# Patient Record
Sex: FEMALE | Race: WHITE | Employment: OTHER
[De-identification: names, ages, dates, MRNs, and addresses within clinical notes are randomized per-mention and may not be internally consistent; named-entity substitution may affect disease eponyms.]

---

## 2020-12-08 ENCOUNTER — TRANSCRIBE ORDERS (OUTPATIENT)
Dept: OTHER | Age: 60
End: 2020-12-08

## 2020-12-08 DIAGNOSIS — H50.9 STRABISMUS: Primary | ICD-10-CM

## 2021-01-09 ENCOUNTER — HEALTH MAINTENANCE LETTER (OUTPATIENT)
Age: 61
End: 2021-01-09

## 2021-01-15 ENCOUNTER — OFFICE VISIT (OUTPATIENT)
Dept: OPHTHALMOLOGY | Facility: CLINIC | Age: 61
End: 2021-01-15
Attending: OPTOMETRIST
Payer: COMMERCIAL

## 2021-01-15 DIAGNOSIS — H50.9 STRABISMUS: ICD-10-CM

## 2021-01-15 DIAGNOSIS — H53.10 SUBJECTIVE VISUAL DISTURBANCE: ICD-10-CM

## 2021-01-15 DIAGNOSIS — H53.2 DOUBLE VISION: ICD-10-CM

## 2021-01-15 PROCEDURE — G0463 HOSPITAL OUTPT CLINIC VISIT: HCPCS | Performed by: TECHNICIAN/TECHNOLOGIST

## 2021-01-15 PROCEDURE — 99203 OFFICE O/P NEW LOW 30 MIN: CPT | Performed by: OPHTHALMOLOGY

## 2021-01-15 RX ORDER — AMLODIPINE BESYLATE 5 MG/1
TABLET ORAL
COMMUNITY
Start: 2020-11-06

## 2021-01-15 RX ORDER — FLUTICASONE PROPIONATE 50 MCG
1 SPRAY, SUSPENSION (ML) NASAL
COMMUNITY

## 2021-01-15 RX ORDER — GABAPENTIN 300 MG/1
CAPSULE ORAL
COMMUNITY
Start: 2020-12-02

## 2021-01-15 RX ORDER — ACETAMINOPHEN 325 MG/1
1-2 TABLET ORAL
COMMUNITY

## 2021-01-15 RX ORDER — CYCLOBENZAPRINE HCL 10 MG
TABLET ORAL
COMMUNITY
Start: 2020-09-14

## 2021-01-15 RX ORDER — TOPIRAMATE 100 MG/1
TABLET, FILM COATED ORAL
COMMUNITY
Start: 2020-11-30

## 2021-01-15 RX ORDER — DEXAMETHASONE SODIUM PHOSPHATE 1 MG/ML
SOLUTION/ DROPS OPHTHALMIC
COMMUNITY
Start: 2020-12-21

## 2021-01-15 RX ORDER — DIAZEPAM 5 MG
TABLET ORAL
COMMUNITY
Start: 2020-11-24

## 2021-01-15 RX ORDER — IBUPROFEN 200 MG
200-600 TABLET ORAL
COMMUNITY
Start: 2020-12-21

## 2021-01-15 RX ORDER — DOXEPIN HYDROCHLORIDE 50 MG/1
CAPSULE ORAL
COMMUNITY
Start: 2020-09-14

## 2021-01-15 RX ORDER — CIPROFLOXACIN HYDROCHLORIDE 3.5 MG/ML
SOLUTION/ DROPS TOPICAL
COMMUNITY
Start: 2020-12-21

## 2021-01-15 ASSESSMENT — CONF VISUAL FIELD
OD_NORMAL: 1
METHOD: COUNTING FINGERS
OS_NORMAL: 1

## 2021-01-15 ASSESSMENT — VISUAL ACUITY
OD_CC+: -2
METHOD: SNELLEN - LINEAR
CORRECTION_TYPE: GLASSES
OD_CC: 20/30
OS_CC: 20/30
OS_CC+: -2

## 2021-01-15 ASSESSMENT — EXTERNAL EXAM - RIGHT EYE: OD_EXAM: NORMAL

## 2021-01-15 ASSESSMENT — TONOMETRY
OS_IOP_MMHG: 10
IOP_METHOD: ICARE
OD_IOP_MMHG: 10

## 2021-01-15 ASSESSMENT — SLIT LAMP EXAM - LIDS
COMMENTS: NORMAL
COMMENTS: NORMAL

## 2021-01-15 ASSESSMENT — REFRACTION_WEARINGRX
OD_HPRISM: 10 BO
OS_CYLINDER: +0.50
OD_CYLINDER: SPHERE
OD_SPHERE: -1.75
OD_ADD: +3.00
OS_HPRISM: 10 BO
OS_SPHERE: -1.00
OS_AXIS: 054
OS_ADD: +3.00
SPECS_TYPE: PAL

## 2021-01-15 ASSESSMENT — MARGIN REFLEX DISTANCE
OS_MRD1: 1
OD_MRD1: 2

## 2021-01-15 ASSESSMENT — EXTERNAL EXAM - LEFT EYE: OS_EXAM: PTOSIS

## 2021-01-15 NOTE — PROGRESS NOTES
1.  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  Subjectively the patient's diplopia has been stable for 4 years which is also objectively corroborated by lack of change in her prism requirements in that timeframe.  Today the patient is highly motivated for strabismus surgery.  We discussed the risk benefits and alternatives of strabismus surgery surgery and she wishes to proceed although she does plan to first find out whether additional surgery will be indicated for middle ear involvement by her meningioma.  Patient will call us back when she decides that she would like to proceed with strabismus surgery.    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 She reports that she started having horizontal binocular diplopia 4 year ago that has been stable since.  No change in prism strength in that time frame    She has been wearing prism glasses with the same prism prescription for 4 years. She started having right ear pain for 2 years that did not resistant to oral antibiotics. Then they saw Dr Rocha - ENT- who did a biopsy if the right middle ear that revealed a meningioma. He thinks that it grew fron CP angle in to her ear. Her last visit with neurosurgeon Dr De La Cruz was in 9/21/2020, back then he thought she was stable and he want to see her in 1 year with repeat MRI brain. Dr Rocha thought that the thinner slices did show that the meningiomas were indeed connected. She has chronic migraines that have been stable,they get worse with change in weather    Patient may have additional inner ear surgery. No plans for radiation therapy.      Independent  historians:   present during the meeting and providing additional information.    Review of outside testing:    MRI internal auditory canal with and without contrast 12/3/2020  IMPRESSION:  1. Both CT and MRI demonstrated the large recurrent extra-axial mass with osseous involvement in the region of the right cerebellopontine angle, consistent with recurrent meningioma.  2. Mass involves the clivus, right occipital condyle/occipital bone, right temporal bone petrous apex extending to the mastoid segment.  3. The mass extends into the posterior aspect of the right sphenoid sinus, posterior right petrous carotid canal, right inferior cavernous sinus.  4. The mass nearly completely opacifies the right IAC, encasing the associated nerves.  5. Mass encircles approximately 180 degrees of the intradural right vertebral artery.  6. The mass extends through an approximately 2 cm inferior to the right jugular foramen and abuts the extracranial portion of the cervical right ICA.  7. Dural thickening extends into the anterior aspect of the left IAC; this is suspected to represent venous enhancement/congestion; however, extension of mass into the left IAC cannot be entirely excluded on imaging.  8. Prior right retrosigmoid craniotomy.    MRI brain with and without contrast 9/21/2020  IMPRESSION:  1. Right ventral prepontine cistern meningioma appears slightly more full going back over several examinations, however no significant change when compared to the most recent prior. No new subjacent brain stem edema. There is stable T2 hyperintensity within the right inferior mark.  2. Stable invasion of the right aspect of the clivus and the jugular tubercle through the right jugular fossa. High right carotid space component of the lesion is increased in size measuring 11 x 17 mm, previously 8 x 17 mm.    We do not have access to the MRIs.  We are requesting they be pushed to us.    Sensorimotor examination today notable for a  mild abduction deficit in the right eye.  There was a 20-25 prism diopter esotropia in primary gaze that increased in right gaze and decreased in left gaze.    We discussed in detail the risks, benefits, and alternatives of eye muscle correction surgery including the very rare risk of death or serious morbidity from a general anesthesia complication and the rare risk of severe vision loss in the operative eye(s) secondary to retinal detachment or endophthalmitis.  We discussed more likely sub-optimal outcomes including the unanticipated need for additional strabismus surgery.  Finally the patient was aware that prisms glasses may be required to optimize single vision following surgery.    After a thorough discussion of these risks, the patient decided to consider strabismus surgery.  The surgical plan would be as follows:     1. Right lateral rectus resection    2. Left medial rectus recession     Both on fixed suture considering the resection of paretic muscle    Follow-up 1 week after strabismus surgery.    [place] [facility] [place]   Prism free glasses for adjustment: Non-adjustable. Patient feels she has prism free glasses at home (will check on this)         35 minutes were spent on the date of the encounter by me doing chart review, history and exam, documentation, and further activities as noted above    Complete documentation of historical and exam elements from today's encounter can be found in the full encounter summary report (not reduplicated in this progress note).  I personally obtained the chief complaint(s) and history of present illness.  I confirmed and edited as necessary the review of systems, past medical/surgical history, family history, social history, and examination findings as documented by others; and I examined the patient myself.  I personally reviewed the relevant tests, images, and reports as documented above.  I formulated and edited as necessary the assessment and plan and discussed  the findings and management plan with the patient and family     Joel Mesa MD

## 2021-01-20 ENCOUNTER — MYC MEDICAL ADVICE (OUTPATIENT)
Dept: OPHTHALMOLOGY | Facility: CLINIC | Age: 61
End: 2021-01-20

## 2021-01-20 DIAGNOSIS — H53.2 DOUBLE VISION: Primary | ICD-10-CM

## 2021-01-21 ENCOUNTER — TELEPHONE (OUTPATIENT)
Dept: OPHTHALMOLOGY | Facility: CLINIC | Age: 61
End: 2021-01-21

## 2021-01-21 ENCOUNTER — PREP FOR PROCEDURE (OUTPATIENT)
Dept: OPHTHALMOLOGY | Facility: CLINIC | Age: 61
End: 2021-01-21

## 2021-01-21 DIAGNOSIS — H53.2 DOUBLE VISION: Primary | ICD-10-CM

## 2021-01-21 NOTE — TELEPHONE ENCOUNTER
1/21/2021 1:45PM Offered 2/1 surgery date. Nadia requested additional dates (gave her 2/8 & 2/15). She will call back to schedule.

## 2021-01-30 DIAGNOSIS — Z11.59 ENCOUNTER FOR SCREENING FOR OTHER VIRAL DISEASES: ICD-10-CM

## 2021-02-01 ENCOUNTER — TELEPHONE (OUTPATIENT)
Dept: OPHTHALMOLOGY | Facility: CLINIC | Age: 61
End: 2021-02-01

## 2021-02-01 NOTE — TELEPHONE ENCOUNTER
2/1/2021 2:26PM Relayed to Nadia that Dr. Mesa recommends she not schedule an appointment with the oculoplastics doctor to coordinate with her 2/26 post-op appointment. Instead, he will have her come back to see him in 3-4 months after the 2/26 appointment to allow her eye(s) to heal. She may schedule an appointment with the oculoplastics doctor at that time.    2/1/2021 11:22AM Received a call from Jesus and Nadia Corbett. Nadia had her H&P on 1/29/2021 and they wanted to confirm that it was pushed through to us; confirmed H&P was available in our system.    They have COVID-19 testing scheduled in [place] on 2/11 & wanted to confirm that date was acceptable for surgery on 2/15; confirmed. They also requested the scheduling number for testing at [facility] to schedule testing on Friday, 2/12, in case there is a 'snafu' with testing in [place] on the 11th; number provided.    They also wish to schedule an appointment with the oculoplastics provider on 2/26/2021 when they have their followup with Dr. Mesa. Advised we do not have an oculoplastics provider available on 2/25 or 2/26. Offered 3/1 appointment with Dr. Apodaca, but will confirm with Dr. Mesa that consult with oculoplastics provider on 3/1 is appropriate prior to scheduling.     Also asked about prior authorization status for their insurance, [employer]. Advised that I contacted [employer] [facility] on 1/21/2021 and determined that prior authorization is not needed for Nadia's 2/15/2021 surgery.

## 2021-02-12 ENCOUNTER — ANESTHESIA EVENT (OUTPATIENT)
Dept: SURGERY | Facility: AMBULATORY SURGERY CENTER | Age: 61
End: 2021-02-12

## 2021-02-15 ENCOUNTER — ANESTHESIA (OUTPATIENT)
Dept: SURGERY | Facility: AMBULATORY SURGERY CENTER | Age: 61
End: 2021-02-15

## 2021-02-15 ENCOUNTER — HOSPITAL ENCOUNTER (OUTPATIENT)
Facility: AMBULATORY SURGERY CENTER | Age: 61
Discharge: HOME | End: 2021-02-15
Attending: OPHTHALMOLOGY | Admitting: OPHTHALMOLOGY
Payer: COMMERCIAL

## 2021-02-15 VITALS
WEIGHT: 172 LBS | TEMPERATURE: 97 F | BODY MASS INDEX: 27 KG/M2 | HEIGHT: 67 IN | HEART RATE: 86 BPM | SYSTOLIC BLOOD PRESSURE: 137 MMHG | OXYGEN SATURATION: 96 % | RESPIRATION RATE: 14 BRPM | DIASTOLIC BLOOD PRESSURE: 79 MMHG

## 2021-02-15 DIAGNOSIS — Z98.890 POSTOPERATIVE EYE STATE: Primary | ICD-10-CM

## 2021-02-15 DIAGNOSIS — H53.2 DOUBLE VISION: ICD-10-CM

## 2021-02-15 PROCEDURE — 67311 REVISE EYE MUSCLE: CPT | Mod: RT

## 2021-02-15 RX ORDER — SODIUM CHLORIDE, SODIUM LACTATE, POTASSIUM CHLORIDE, CALCIUM CHLORIDE 600; 310; 30; 20 MG/100ML; MG/100ML; MG/100ML; MG/100ML
INJECTION, SOLUTION INTRAVENOUS CONTINUOUS
Status: DISCONTINUED | OUTPATIENT
Start: 2021-02-15 | End: 2021-02-16 | Stop reason: HOSPADM

## 2021-02-15 RX ORDER — NALOXONE HYDROCHLORIDE 0.4 MG/ML
0.4 INJECTION, SOLUTION INTRAMUSCULAR; INTRAVENOUS; SUBCUTANEOUS
Status: DISCONTINUED | OUTPATIENT
Start: 2021-02-15 | End: 2021-02-16 | Stop reason: HOSPADM

## 2021-02-15 RX ORDER — ALBUTEROL SULFATE 0.83 MG/ML
2.5 SOLUTION RESPIRATORY (INHALATION) EVERY 4 HOURS PRN
Status: DISCONTINUED | OUTPATIENT
Start: 2021-02-15 | End: 2021-02-15 | Stop reason: HOSPADM

## 2021-02-15 RX ORDER — BALANCED SALT SOLUTION 6.4; .75; .48; .3; 3.9; 1.7 MG/ML; MG/ML; MG/ML; MG/ML; MG/ML; MG/ML
SOLUTION OPHTHALMIC PRN
Status: DISCONTINUED | OUTPATIENT
Start: 2021-02-15 | End: 2021-02-15 | Stop reason: HOSPADM

## 2021-02-15 RX ORDER — ACETAMINOPHEN 325 MG/1
975 TABLET ORAL ONCE
Status: COMPLETED | OUTPATIENT
Start: 2021-02-15 | End: 2021-02-15

## 2021-02-15 RX ORDER — OXYMETAZOLINE HYDROCHLORIDE 0.05 G/100ML
SPRAY NASAL PRN
Status: DISCONTINUED | OUTPATIENT
Start: 2021-02-15 | End: 2021-02-15 | Stop reason: HOSPADM

## 2021-02-15 RX ORDER — DIMENHYDRINATE 50 MG/ML
25 INJECTION, SOLUTION INTRAMUSCULAR; INTRAVENOUS
Status: DISCONTINUED | OUTPATIENT
Start: 2021-02-15 | End: 2021-02-16 | Stop reason: HOSPADM

## 2021-02-15 RX ORDER — SODIUM CHLORIDE, SODIUM LACTATE, POTASSIUM CHLORIDE, CALCIUM CHLORIDE 600; 310; 30; 20 MG/100ML; MG/100ML; MG/100ML; MG/100ML
INJECTION, SOLUTION INTRAVENOUS CONTINUOUS PRN
Status: DISCONTINUED | OUTPATIENT
Start: 2021-02-15 | End: 2021-02-15

## 2021-02-15 RX ORDER — LIDOCAINE HYDROCHLORIDE 20 MG/ML
INJECTION, SOLUTION INFILTRATION; PERINEURAL PRN
Status: DISCONTINUED | OUTPATIENT
Start: 2021-02-15 | End: 2021-02-15

## 2021-02-15 RX ORDER — GABAPENTIN 300 MG/1
300 CAPSULE ORAL ONCE
Status: COMPLETED | OUTPATIENT
Start: 2021-02-15 | End: 2021-02-15

## 2021-02-15 RX ORDER — LORAZEPAM 2 MG/ML
.5-1 INJECTION INTRAMUSCULAR
Status: DISCONTINUED | OUTPATIENT
Start: 2021-02-15 | End: 2021-02-15 | Stop reason: HOSPADM

## 2021-02-15 RX ORDER — MEPERIDINE HYDROCHLORIDE 25 MG/ML
12.5 INJECTION INTRAMUSCULAR; INTRAVENOUS; SUBCUTANEOUS
Status: DISCONTINUED | OUTPATIENT
Start: 2021-02-15 | End: 2021-02-16 | Stop reason: HOSPADM

## 2021-02-15 RX ORDER — PREDNISOLONE ACETATE 10 MG/ML
SUSPENSION/ DROPS OPHTHALMIC
Qty: 10 ML | Refills: 0 | Status: SHIPPED | OUTPATIENT
Start: 2021-02-15

## 2021-02-15 RX ORDER — ONDANSETRON 4 MG/1
4 TABLET, ORALLY DISINTEGRATING ORAL EVERY 30 MIN PRN
Status: DISCONTINUED | OUTPATIENT
Start: 2021-02-15 | End: 2021-02-16 | Stop reason: HOSPADM

## 2021-02-15 RX ORDER — KETOROLAC TROMETHAMINE 30 MG/ML
30 INJECTION, SOLUTION INTRAMUSCULAR; INTRAVENOUS EVERY 6 HOURS PRN
Status: DISCONTINUED | OUTPATIENT
Start: 2021-02-15 | End: 2021-02-16 | Stop reason: HOSPADM

## 2021-02-15 RX ORDER — PROPOFOL 10 MG/ML
INJECTION, EMULSION INTRAVENOUS PRN
Status: DISCONTINUED | OUTPATIENT
Start: 2021-02-15 | End: 2021-02-15

## 2021-02-15 RX ORDER — FENTANYL CITRATE 50 UG/ML
25-50 INJECTION, SOLUTION INTRAMUSCULAR; INTRAVENOUS
Status: DISCONTINUED | OUTPATIENT
Start: 2021-02-15 | End: 2021-02-16 | Stop reason: HOSPADM

## 2021-02-15 RX ORDER — FENTANYL CITRATE 50 UG/ML
25-50 INJECTION, SOLUTION INTRAMUSCULAR; INTRAVENOUS
Status: DISCONTINUED | OUTPATIENT
Start: 2021-02-15 | End: 2021-02-15 | Stop reason: HOSPADM

## 2021-02-15 RX ORDER — PROPOFOL 10 MG/ML
INJECTION, EMULSION INTRAVENOUS CONTINUOUS PRN
Status: DISCONTINUED | OUTPATIENT
Start: 2021-02-15 | End: 2021-02-15

## 2021-02-15 RX ORDER — FENTANYL CITRATE 50 UG/ML
INJECTION, SOLUTION INTRAMUSCULAR; INTRAVENOUS PRN
Status: DISCONTINUED | OUTPATIENT
Start: 2021-02-15 | End: 2021-02-15

## 2021-02-15 RX ORDER — NALOXONE HYDROCHLORIDE 0.4 MG/ML
0.2 INJECTION, SOLUTION INTRAMUSCULAR; INTRAVENOUS; SUBCUTANEOUS
Status: DISCONTINUED | OUTPATIENT
Start: 2021-02-15 | End: 2021-02-16 | Stop reason: HOSPADM

## 2021-02-15 RX ORDER — OXYCODONE HYDROCHLORIDE 5 MG/1
5 TABLET ORAL EVERY 4 HOURS PRN
Status: DISCONTINUED | OUTPATIENT
Start: 2021-02-15 | End: 2021-02-16 | Stop reason: HOSPADM

## 2021-02-15 RX ORDER — ONDANSETRON 2 MG/ML
INJECTION INTRAMUSCULAR; INTRAVENOUS PRN
Status: DISCONTINUED | OUTPATIENT
Start: 2021-02-15 | End: 2021-02-15

## 2021-02-15 RX ORDER — ONDANSETRON 2 MG/ML
4 INJECTION INTRAMUSCULAR; INTRAVENOUS EVERY 30 MIN PRN
Status: DISCONTINUED | OUTPATIENT
Start: 2021-02-15 | End: 2021-02-16 | Stop reason: HOSPADM

## 2021-02-15 RX ORDER — HYDROMORPHONE HYDROCHLORIDE 1 MG/ML
.3-.5 INJECTION, SOLUTION INTRAMUSCULAR; INTRAVENOUS; SUBCUTANEOUS EVERY 10 MIN PRN
Status: DISCONTINUED | OUTPATIENT
Start: 2021-02-15 | End: 2021-02-16 | Stop reason: HOSPADM

## 2021-02-15 RX ADMIN — PROPOFOL 150 MCG/KG/MIN: 10 INJECTION, EMULSION INTRAVENOUS at 08:45

## 2021-02-15 RX ADMIN — LIDOCAINE HYDROCHLORIDE 80 MG: 20 INJECTION, SOLUTION INFILTRATION; PERINEURAL at 08:44

## 2021-02-15 RX ADMIN — ONDANSETRON 4 MG: 2 INJECTION INTRAMUSCULAR; INTRAVENOUS at 08:35

## 2021-02-15 RX ADMIN — KETOROLAC TROMETHAMINE 30 MG: 30 INJECTION, SOLUTION INTRAMUSCULAR; INTRAVENOUS at 09:45

## 2021-02-15 RX ADMIN — FENTANYL CITRATE 50 MCG: 50 INJECTION, SOLUTION INTRAMUSCULAR; INTRAVENOUS at 08:44

## 2021-02-15 RX ADMIN — SODIUM CHLORIDE, SODIUM LACTATE, POTASSIUM CHLORIDE, CALCIUM CHLORIDE: 600; 310; 30; 20 INJECTION, SOLUTION INTRAVENOUS at 08:00

## 2021-02-15 RX ADMIN — ACETAMINOPHEN 975 MG: 325 TABLET ORAL at 08:05

## 2021-02-15 RX ADMIN — PROPOFOL 250 MG: 10 INJECTION, EMULSION INTRAVENOUS at 08:44

## 2021-02-15 RX ADMIN — GABAPENTIN 300 MG: 300 CAPSULE ORAL at 08:05

## 2021-02-15 RX ADMIN — OXYCODONE HYDROCHLORIDE 5 MG: 5 TABLET ORAL at 09:37

## 2021-02-15 ASSESSMENT — MIFFLIN-ST. JEOR: SCORE: 1380.99

## 2021-02-15 NOTE — BRIEF OP NOTE
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   Surgeon: Joel Mesa   Assistants(s): marie Negrete   Estimated blood loss: Less than 1 cc    Specimens: None   Findings: See full operative report

## 2021-02-15 NOTE — ANESTHESIA POSTPROCEDURE EVALUATION
Patient: Nadia Corbett    Procedure(s):  Bilateral eye muscle correction.    Diagnosis:Double vision [H53.2]  Diagnosis Additional Information: No value filed.    Anesthesia Type:  General    Note:  Disposition: Outpatient   Postop Pain Control: Uneventful            Sign Out: Well controlled pain   PONV: No   Neuro/Psych: Uneventful            Sign Out: Acceptable/Baseline neuro status   Airway/Respiratory: Uneventful            Sign Out: Acceptable/Baseline resp. status   CV/Hemodynamics: Uneventful            Sign Out: Acceptable CV status   Other NRE: NONE   DID A NON-ROUTINE EVENT OCCUR? No         Last vitals:  Vitals:    02/15/21 0951 02/15/21 1008 02/15/21 1022   BP: 135/87 135/77 137/79   Pulse: 78 82 86   Resp: 14 14 14   Temp: 36  C (96.8  F) 36  C (96.8  F) 36.1  C (97  F)   SpO2: 100% 96% 96%       Last vitals prior to Anesthesia Care Transfer:  CRNA VITALS  2/15/2021 0849 - 2/15/2021 0949      2/15/2021             Pulse:  86    SpO2:  98 %    Resp Rate (observed):  9          Electronically Signed By: Gerson Harkins MD  February 15, 2021  3:15 PM

## 2021-02-15 NOTE — ANESTHESIA CARE TRANSFER NOTE
Patient: Nadia Corbett    Procedure(s):  Bilateral eye muscle correction.    Diagnosis: Double vision [H53.2]  Diagnosis Additional Information: No value filed.    Anesthesia Type:   General     Note:    Oropharynx: oropharynx clear of all foreign objects and spontaneously breathing  Level of Consciousness: awake  Oxygen Supplementation: room air    Independent Airway: airway patency satisfactory and stable  Dentition: dentition unchanged  Vital Signs Stable: post-procedure vital signs reviewed and stable  Report to RN Given: handoff report given  Patient transferred to: PACU  Comments: Uneventful transport   Report to RN  Exchanging well; color natl  Pt responds appropriately to command  IV patent  Lips/teeth/dentition as preop status  Questions answered  /77  HR 95 nsr  TAT 96.3  RR 16  Sat 96%    Handoff Report: Identifed the Patient, Identified the Reponsible Provider, Reviewed the pertinent medical history, Discussed the surgical course, Reviewed Intra-OP anesthesia mangement and issues during anesthesia, Set expectations for post-procedure period and Allowed opportunity for questions and acknowledgement of understanding      Vitals: (Last set prior to Anesthesia Care Transfer)  CRNA VITALS  2/15/2021 0849 - 2/15/2021 0925      2/15/2021             Pulse:  86    SpO2:  98 %    Resp Rate (observed):  9        Electronically Signed By: RADHA REDDING CRNA  February 15, 2021  9:25 AM

## 2021-02-15 NOTE — ANESTHESIA PREPROCEDURE EVALUATION
Anesthesia Pre-Procedure Evaluation    Patient: Nadia Corbett   MRN: [id number] : 1960        Preoperative Diagnosis: Double vision [H53.2]   Procedure : Procedure(s):  Bilateral eye muscle correction.     No past medical history on file.   History reviewed. No pertinent surgical history.   Allergies   Allergen Reactions     Cephalexin Rash      Social History     Tobacco Use     Smoking status: Not on file   Substance Use Topics     Alcohol use: Not on file      Wt Readings from Last 1 Encounters:   02/15/21 78 kg (172 lb)        Anesthesia Evaluation            ROS/MED HX  ENT/Pulmonary:  - neg pulmonary ROS   (+) allergic rhinitis,     Neurologic: Comment: Meningioma - neg neurologic ROS   (+) migraines,     Cardiovascular:  - neg cardiovascular ROS     METS/Exercise Tolerance:     Hematologic:  - neg hematologic  ROS     Musculoskeletal:  - neg musculoskeletal ROS     GI/Hepatic:  - neg GI/hepatic ROS   (+) GERD,     Renal/Genitourinary:  - neg Renal ROS     Endo:  - neg endo ROS     Psychiatric/Substance Use:  - neg psychiatric ROS   (+) psychiatric history anxiety     Infectious Disease:  - neg infectious disease ROS     Malignancy:  - neg malignancy ROS     Other:  - neg other ROS          Physical Exam    Airway  airway exam normal      Mallampati: II   TM distance: > 3 FB   Neck ROM: full   Mouth opening: > 3 cm    Respiratory Devices and Support         Dental  no notable dental history         Cardiovascular          Rhythm and rate: regular and normal     Pulmonary   pulmonary exam normal        breath sounds clear to auscultation           OUTSIDE LABS:  CBC: No results found for: WBC, HGB, HCT, PLT  BMP: No results found for: NA, POTASSIUM, CHLORIDE, CO2, BUN, CR, GLC  COAGS: No results found for: PTT, INR, FIBR  POC: No results found for: BGM, HCG, HCGS  HEPATIC: No results found for: ALBUMIN, PROTTOTAL, ALT, AST, GGT, ALKPHOS, BILITOTAL, BILIDIRECT, REMY  OTHER: No results found for: PH,  LACT, A1C, SUGAR, PHOS, MAG, LIPASE, AMYLASE, TSH, T4, T3, CRP, SED    Anesthesia Plan    ASA Status:  2   NPO Status:  NPO Appropriate    Anesthesia Type: General.     - Airway: LMA   Induction: Intravenous.   Maintenance: Balanced.        Consents    Anesthesia Plan(s) and associated risks, benefits, and realistic alternatives discussed. Questions answered and patient/representative(s) expressed understanding.     - Discussed with:  Patient      - Extended Intubation/Ventilatory Support Discussed: no Extended Intubation.      - Patient is DNR/DNI Status: No    Use of blood products discussed: No .     Postoperative Care    Pain management: IV analgesics, Oral pain medications, Multi-modal analgesia.   PONV prophylaxis: Ondansetron (or other 5HT-3), Dexamethasone or Solumedrol     Comments:                Gerson Harkins MD

## 2021-02-15 NOTE — DISCHARGE INSTRUCTIONS
[facility]  Home Care Following Anesthesia  For 24 hours after surgery:  1. Get plenty of rest.  A responsible adult must stay with you for at least 24 hours after you leave the surgery center.  2. Do not drive or use heavy equipment.  If you have weakness or tingling, don't drive or use heavy equipment until this feeling goes away.   3. Do not drink alcohol.   4. Avoid strenuous or risky activities.  Ask for help when climbing stairs.  5. You may feel lightheaded.  IF so, sit for a few minutes before standing.  Have someone help you get up.   6. If you have nausea (feel sick to your stomach): Drink only clear liquids such as apple juice, ginger ale, broth or 7-Up.  Rest may also help.  Be sure to drink enough fluids.  Move to a regular diet as you feel able.   7. You may have a slight fever.  Call the doctor if your fever is over 100 F (37.7 C) (taken under the tongue) or lasts longer than 24 hours.  8. You may have a dry mouth, a sore throat, muscle aches or trouble sleeping. These should go away after 24 hours.  9. Do not make important or legal decisions.               Tips for taking pain medications  To get the best pain relief possible, remember these points:    Take pain medications as directed, before pain becomes severe.    Pain medication can upset your stomach: taking it with food may help.    Constipation is a common side effect of pain medication. Drink plenty of  fluids.    Eat foods high in fiber. Take a stool softener if recommended by your doctor or pharmacist.    Do not drink alcohol, drive or operate machinery while taking pain medications.    Ask about other ways to control pain, such as with heat, ice or relaxation.    Tylenol/Acetaminophen Consumption  To help encourage the safe use of acetaminophen, the makers of TYLENOL  have lowered the maximum daily dose for single-ingredient Extra Strength TYLENOL  (acetaminophen) products sold in the [place] from 8  pills per day (4,000 mg) to 6 pills per day (3,000 mg). The dosing interval has also changed from 2 pills every 4-6 hours to 2 pills every 6 hours.    If you feel your pain relief is insufficient, you may take Tylenol/Acetaminophen in addition to your narcotic pain medication.     Be careful not to exceed 3,000 mg of Tylenol/Acetaminophen in a 24 hour period from all sources.    If you are taking extra strength Tylenol/acetaminophen (500 mg), the maximum dose is 6 tablets in 24 hours.    If you are taking regular strength acetaminophen (325 mg), the maximum dose is 9 tablets in 24 hours.    Call a doctor for any of the followin. Signs of infection (fever, growing tenderness at the surgery site, a large amount of drainage or bleeding, severe pain, foul-smelling drainage, redness, swelling).  2. It has been over 8 to 10 hours since surgery and you are still not able to urinate (pass water).  3. Headache for over 24 hours.  4. Numbness, tingling or weakness the day after surgery (if you had spinal anesthesia).  5. Signs of Covid-19 infection (temperature over 100 degrees, shortness of breath, cough, loss of taste/smell, generalized body aches, persistent headache, chills, sore throat, nausea/vomiting/diarrhea)  Your doctor is:       Dr. Joel Mesa, Ophthalmology: [phone]               Or dial [phone] and ask for the resident on call for:  Ophthalmology  For emergency care, call the:  [facility]:  [phone] (TTY for hearing impaired: [phone])

## 2021-02-15 NOTE — OP NOTE
"ATTENDING SURGEON:  Joel Mesa MD    DATE OF PROCEDURE:  February 15, 2021    FIRST SURGICAL ASSISTANT:  Virginia Acevedo MD: Neuro-ophthalmology fellow     ANESTHESIA:  General anesthesia    PREOPERATIVE DIAGNOSIS (ES):  1. Right cranial nerve 6 palsy with incomitant esotropia     POSTOPERATIVE DIAGNOSIS (ES):  Same    NAME OF OPERATION:  1. Right lateral rectus resection 6.0 mm   2. Left medial rectus recession 5.5 mm    INDICATIONS FOR PROCEDURE:    The patient is a pleasant 60 year old female with a past medical history significant for a right cerebellopontine angle meningioma with extension into the right clivus causing a right cranial nerve 6 palsy.  She has had stable prism requirements and subjectively stable diplopia for 4 years and was highly motivated for strabismus surgery with no plans for additional surgery or radiation therapy that would affect her strabismus.     I warned the patient about the risks, benefits, and alternatives of eye muscle surgery including a relatively small risk for severe infection, retinal detachment, and permanent vision loss of the eye.  I also discussed the possibility of postoperative double vision.  I discussed the possibility that due to postoperative double vision or a postoperative recurrent strabismus, the patient may require additional strabismus procedures in the future.  Understanding these risks, the patient decided to proceed with strabismus surgery.      DESCRIPTION OF PROCEDURE:    After the risks, benefits, and alternatives of eye muscle surgery were discussed with the patient and the patient's questions were answered both in clinic and on the day of surgery, written informed consent was obtained and witnessed in the preoperative holding area on the day of surgery.  The word \"yes\" was written over both eyes indicating that the patient consented to eye muscle correction in both eyes.  An intravenous line was placed and light intravenous sedation was given.  " The patient was transported to the operating room where after appropriate monitors were placed, the patient underwent induction of general anesthesia without complication.      Both eyes were prepped and draped in the usual sterile fashion for ophthalmic surgery and a lid speculum was placed in the right eye.  Forced duction testing in this eye revealed no restriction.  A trapezoidal temporal conjunctival flap was created using conjunctival forceps and Lester scissors.  This was reflected backwards to expose the right lateral rectus muscle which was isolated using a Dick muscle hook.  The muscle was freed from Tenon's capsule with blunt dissection using a Lester scissors and cotton swab.  A double armed 6-0 Vicryl suture was then woven across the width of the muscle at a point measured to be 6.0 mm posterior to the insertion and tied to the edges.  A hemostat straight clamp was then clamped perpendicular to the muscle just anterior to the suture and the distal 5.5 mm muscle segment was excised with a Lester scissors and 0.3 forceps.  Both arms of the 6-0 Vicryl suture were then passed partial thickness through the sclera in a crossing swords technique at the physiologic insertion site.  At this point, the ends of the suture were tied together tightly advancing the muscle and completing a resection effect of 6.0 mm.  The needles were cut off and the ends were cut short.  The conjunctival flap was then re-opposed in the surgical bed and held in place using two 6-0 plain gut sutures.  The lid speculum was removed from the operative eye.     A lid speculum was placed in the left eye.  Forced duction testing in this eye revealed no restriction.  A trapezoidal nasal conjunctival flap was created using conjunctival forceps and Lester scissors.  This was reflected backwards to expose the left medial rectus muscle which was isolated using a [place] muscle hook.  The muscle was freed from Tenon's capsule with blunt  dissection using a Lester scissors and cotton swab.  A double armed 6-0 Vicryl suture was then woven across the width of the muscle near it's insertion of the globe and tied to the edges.  The muscle was disinserted from the globe using Lester scissors.  Both arms of the 6-0 Vicryl suture were then passed partial thickness through the sclera at a point measured to be 5.5 mm posterior to the physiologic muscle insertion using a caliper.  At this point, the ends of the suture were tied together.  The needles were cut off and the ends were cut short.  The conjunctival flap was then re-opposed in the surgical bed and held in place using two 6-0 plain gut sutures.  The lid speculum was removed from the operative eye.     Maxitrol ointment was placed in both eyes.  The patient was awakened from general anesthesia without complication and discharged to the recovery room in stable condition.       SPECIMENS REMOVED:  None.      ESTIMATED BLOOD LOSS:  1 mL or less.    INTRAOPERATIVE FLUIDS:  As per anesthesia records.      SPONGE/INSTRUMENT/NEEDLE COUNTS:  All sponge, instrument, and needle counts were correct times two.    CONDITION ON DISCHARGE FROM OPERATING ROOM:  Stable.      COMPLICATIONS:  None.     I was present for the entire procedure

## 2021-02-26 ENCOUNTER — OFFICE VISIT (OUTPATIENT)
Dept: OPHTHALMOLOGY | Facility: CLINIC | Age: 61
End: 2021-02-26
Attending: OPHTHALMOLOGY
Payer: COMMERCIAL

## 2021-02-26 DIAGNOSIS — H53.2 DOUBLE VISION: Primary | ICD-10-CM

## 2021-02-26 PROCEDURE — 99024 POSTOP FOLLOW-UP VISIT: CPT | Performed by: OPHTHALMOLOGY

## 2021-02-26 PROCEDURE — G0463 HOSPITAL OUTPT CLINIC VISIT: HCPCS

## 2021-02-26 ASSESSMENT — VISUAL ACUITY
OD_CC+: -1
OS_SC: 20/20
OD_SC+: -1
OD_CC: 20/25
OD_SC: 20/60
METHOD: SNELLEN - LINEAR

## 2021-02-26 NOTE — PROGRESS NOTES
1. 1 week post-op status post strabismus surgery:     -Surgery: 2/15/21  1. Right lateral rectus resection 6.0 mm           2. Left medial rectus recession 5.5 mm    Patient states eye pain much improved- was initially more painful.  Using three times a day in both eyes.  Had diplopia initially but resolved.    - Alignment: Much improved    - Diplopia: Resolved  - Healing up appropriately  - Maxitrol ophthalmic ointment: stop  - Start Predforte 1% four times a day in the operative eye(s) and decrease by one drop daily every week.  Course will complete in 1 month.    Return to clinic in 3 months or sooner as needed.       Complete documentation of historical and exam elements from today's encounter can be found in the full encounter summary report (not reduplicated in this progress note).  I personally obtained the chief complaint(s) and history of present illness.  I confirmed and edited as necessary the review of systems, past medical/surgical history, family history, social history, and examination findings as documented by others; and I examined the patient myself.  I personally reviewed the relevant tests, images, and reports as documented above.  I formulated and edited as necessary the assessment and plan and discussed the findings and management plan with the patient and family   Joel Mesa MD

## 2021-05-17 ENCOUNTER — TELEPHONE (OUTPATIENT)
Dept: OPHTHALMOLOGY | Facility: CLINIC | Age: 61
End: 2021-05-17

## 2021-05-17 NOTE — TELEPHONE ENCOUNTER
I spoke to the patient who notes that she wants her brother to be present when she sees the doctor.  The patient appreciates the return phone call.

## 2021-05-17 NOTE — TELEPHONE ENCOUNTER
[facility] Call Center    Phone Message    May a detailed message be left on voicemail: yes     Reason for Call: Other:   Pt requesting that her brother comes into the clinic with her. Pt does not have a physical disability and wants him to be able to listen in and ask questions since pt thinks she will be needing another surgery. Please advise.     Action Taken: Other:  eye    Travel Screening: Not Applicable

## 2021-05-28 ENCOUNTER — OFFICE VISIT (OUTPATIENT)
Dept: OPHTHALMOLOGY | Facility: CLINIC | Age: 61
End: 2021-05-28
Attending: OPHTHALMOLOGY
Payer: COMMERCIAL

## 2021-05-28 DIAGNOSIS — H50.05 ESOTROPIA, ALTERNATING: Primary | ICD-10-CM

## 2021-05-28 DIAGNOSIS — H53.2 DOUBLE VISION: ICD-10-CM

## 2021-05-28 DIAGNOSIS — H53.10 SUBJECTIVE VISUAL DISTURBANCE: ICD-10-CM

## 2021-05-28 PROCEDURE — G0463 HOSPITAL OUTPT CLINIC VISIT: HCPCS

## 2021-05-28 PROCEDURE — 92060 SENSORIMOTOR EXAMINATION: CPT | Performed by: OPHTHALMOLOGY

## 2021-05-28 PROCEDURE — 92012 INTRM OPH EXAM EST PATIENT: CPT | Mod: GC | Performed by: OPHTHALMOLOGY

## 2021-05-28 ASSESSMENT — EXTERNAL EXAM - RIGHT EYE: OD_EXAM: NORMAL

## 2021-05-28 ASSESSMENT — EXTERNAL EXAM - LEFT EYE: OS_EXAM: PTOSIS

## 2021-05-28 ASSESSMENT — REFRACTION_MANIFEST
OD_CYLINDER: SPHERE
OS_SPHERE: -0.25
OS_CYLINDER: SPHERE
OD_SPHERE: -0.75

## 2021-05-28 ASSESSMENT — REFRACTION_FINALRX
OS_HBASE: OUT
OD_HPRISM: 2
OD_HBASE: OUT
OS_HPRISM: 2

## 2021-05-28 ASSESSMENT — VISUAL ACUITY
METHOD: SNELLEN - LINEAR
OD_SC: 20/20
OS_SC+: -2
OS_SC: 20/20
OD_SC+: -3

## 2021-05-28 ASSESSMENT — TONOMETRY
OD_IOP_MMHG: 10
IOP_METHOD: ICARE
OS_IOP_MMHG: 09

## 2021-05-28 ASSESSMENT — CONF VISUAL FIELD
OD_NORMAL: 1
OS_NORMAL: 1
METHOD: COUNTING FINGERS

## 2021-05-28 ASSESSMENT — SLIT LAMP EXAM - LIDS
COMMENTS: NORMAL
COMMENTS: NORMAL

## 2021-05-28 ASSESSMENT — PATIENT HEALTH QUESTIONNAIRE - PHQ9: SUM OF ALL RESPONSES TO PHQ QUESTIONS 1-9: 7

## 2021-05-28 NOTE — NURSING NOTE
Chief Complaint(s) and History of Present Illness(es)     Follow Up     In both eyes (3 months follow up status post strabismus surgery).  Since onset it is stable.              Comments     -Surgery: 2/15/21  1. Right lateral rectus resection 6.0 mm           2. Left medial rectus recession 5.5 mm    Patient reports no diplopia but reports if she stare straight ahead for awhile she could feel her eye crossing a little bit.   History of LASIK each eye, have not been wearing any glasses.     DOMONIQUE Doty 5/28/2021 9:30 AM              PHQ2/9  - lost  suddenly last month due to hear attack. Feeling sad and depressed. Patient states her brother is helping her at the moment. States no suicidal thoughts. Patient declined any therapy.

## 2021-05-28 NOTE — PROGRESS NOTES
1.  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 Right lateral rectus resection 6.0 mm           2. Left medial rectus recession 5.5 mm    Excellent outcome after strabismus surgery with single binocular vision in all gaze positions most of the time.  Patient will follow up with me as needed in the future.  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  Her home neurology and neurosurgery team will continue to order her follow-up scans.  I advised her that I would be happy to see her back at any point if she needed assistance with prism glasses or potential repeat strabismus surgery.    Review of outside testing:     MRI internal auditory canal with and without contrast 12/3/2020  IMPRESSION:  1. Both CT and MRI demonstrated the large recurrent extra-axial mass with osseous involvement in the region of the right cerebellopontine angle, consistent with recurrent meningioma.  2. Mass involves the clivus, right occipital condyle/occipital bone, right temporal bone petrous apex extending to the mastoid segment.  3. The mass extends into the posterior aspect of the right sphenoid sinus, posterior right petrous carotid canal, right inferior cavernous sinus.  4. The mass nearly completely opacifies the right IAC, encasing the associated nerves.  5. Mass encircles approximately 180 degrees of the intradural right vertebral artery.  6. The mass extends through an approximately 2 cm inferior to the right jugular foramen and abuts the extracranial portion of the cervical right ICA.  7. Dural thickening extends into the anterior aspect of the left IAC; this is suspected to represent venous enhancement/congestion; however,  extension of mass into the left IAC cannot be entirely excluded on imaging.  8. Prior right retrosigmoid craniotomy.     MRI brain with and without contrast 9/21/2020  IMPRESSION:  1. Right ventral prepontine cistern meningioma appears slightly more full going back over several examinations, however no significant change when compared to the most recent prior. No new subjacent brain stem edema. There is stable T2 hyperintensity within the right inferior mark.  2. Stable invasion of the right aspect of the clivus and the jugular tubercle through the right jugular fossa. High right carotid space component of the lesion is increased in size measuring 11 x 17 mm, previously 8 x 17 mm.    Interm history     Last seen in 2/26/2021    She reports that when she is looking straight ahead she feels that her eyes are crossing but she does not have diplopia, if she tilts her head to the right or left she feels more comfortable.  She is healed up exceptionally well from surgery.    She liked 4PD base out over the right eye. She does not want to wear glasses but given that she was intermittently breaking down and becoming diplopic I did give her a prescription for prism for glasses today.  She would only require a total of 4 base out.       Complete documentation of historical and exam elements from today's encounter can be found in the full encounter summary report (not reduplicated in this progress note).  I personally obtained the chief complaint(s) and history of present illness.  I confirmed and edited as necessary the review of systems, past medical/surgical history, family history, social history, and examination findings as documented by others; and I examined the patient myself.  I personally reviewed the relevant tests, images, and reports as documented above.  I formulated and edited as necessary the assessment and plan and discussed the findings and management plan with the patient and family.  I personally reviewed  the ophthalmic test(s) associated with this encounter, agree with the interpretation(s) as documented by the resident/fellow, and have edited the corresponding report(s) as necessary.     MD Virginia Charlton MD  Neuro-ophthalmology fellow  [facility]

## 2021-05-28 NOTE — LETTER
May 31, 2021    RE: Nadia Corbett  : 1960  MRN: [id number]    Dear Providers,    I saw our mutual patient, Nadia Corbett, in follow-up in my clinic recently.  After a thorough neuro-ophthalmic history and examination, I came to the following conclusions:     1.  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 Right lateral rectus resection 6.0 mm           2. Left medial rectus recession 5.5 mm    Excellent outcome after strabismus surgery with single binocular vision in all gaze positions most of the time.  Patient will follow up with me as needed in the future.  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  Her home neurology and neurosurgery team will continue to order her follow-up scans.  I advised her that I would be happy to see her back at any point if she needed assistance with prism glasses or potential repeat strabismus surgery.    Review of outside testing:     MRI internal auditory canal with and without contrast 12/3/2020  IMPRESSION:  1. Both CT and MRI demonstrated the large recurrent extra-axial mass with osseous involvement in the region of the right cerebellopontine angle, consistent with recurrent meningioma.  2. Mass involves the clivus, right occipital condyle/occipital bone, right temporal bone petrous apex extending to the mastoid segment.  3. The mass extends into the posterior aspect of the right sphenoid sinus, posterior right petrous carotid canal, right inferior cavernous sinus.  4. The mass nearly completely opacifies the right IAC, encasing the associated nerves.  5. Mass encircles approximately 180 degrees of the intradural right vertebral artery.  6. The mass extends through an  approximately 2 cm inferior to the right jugular foramen and abuts the extracranial portion of the cervical right ICA.  7. Dural thickening extends into the anterior aspect of the left IAC; this is suspected to represent venous enhancement/congestion; however, extension of mass into the left IAC cannot be entirely excluded on imaging.  8. Prior right retrosigmoid craniotomy.     MRI brain with and without contrast 9/21/2020  IMPRESSION:  1. Right ventral prepontine cistern meningioma appears slightly more full going back over several examinations, however no significant change when compared to the most recent prior. No new subjacent brain stem edema. There is stable T2 hyperintensity within the right inferior mark.  2. Stable invasion of the right aspect of the clivus and the jugular tubercle through the right jugular fossa. High right carotid space component of the lesion is increased in size measuring 11 x 17 mm, previously 8 x 17 mm.    Interm history     Last seen in 2/26/2021    She reports that when she is looking straight ahead she feels that her eyes are crossing but she does not have diplopia, if she tilts her head to the right or left she feels more comfortable.  She is healed up exceptionally well from surgery.    She liked 4PD base out over the right eye. She does not want to wear glasses but given that she was intermittently breaking down and becoming diplopic I did give her a prescription for prism for glasses today.  She would only require a total of 4 base out.        For further exam details, please feel free to contact our office for additional records.  If you wish to contact me regarding this patient please email me at [email or web address] or give my clinic a call to arrange a phone conversation.    Sincerely,    Joel Mesa MD  , Neuro-Ophthalmology and Adult Strabismus Surgery  [facility]iselda Virk [facility] in Neuro-Ophthalmology  Department of Ophthalmology and  Visual Neurosciences  [facility]

## 2021-10-11 ENCOUNTER — HEALTH MAINTENANCE LETTER (OUTPATIENT)
Age: 61
End: 2021-10-11

## 2022-01-30 ENCOUNTER — HEALTH MAINTENANCE LETTER (OUTPATIENT)
Age: 62
End: 2022-01-30

## 2022-09-24 ENCOUNTER — HEALTH MAINTENANCE LETTER (OUTPATIENT)
Age: 62
End: 2022-09-24

## 2023-05-08 ENCOUNTER — HEALTH MAINTENANCE LETTER (OUTPATIENT)
Age: 63
End: 2023-05-08

## 2023-08-05 ENCOUNTER — HEALTH MAINTENANCE LETTER (OUTPATIENT)
Age: 63
End: 2023-08-05

## 2024-05-11 ENCOUNTER — HEALTH MAINTENANCE LETTER (OUTPATIENT)
Age: 64
End: 2024-05-11

## (undated) DEVICE — APPLICATOR COTTON TIP 3" PKG OF 10 34831010

## (undated) DEVICE — LINEN TOWEL PACK X5 5464

## (undated) DEVICE — DRAPE STERI TOWEL LG 1010

## (undated) DEVICE — GLOVE PROTEXIS MICRO 7.5  2D73PM75

## (undated) DEVICE — PACK MINOR EYE CUSTOM ASC

## (undated) DEVICE — EYE PREP BETADINE 5% SOLUTION 30ML 0065-0411-30

## (undated) DEVICE — SOL WATER IRRIG 500ML BOTTLE 2F7113

## (undated) DEVICE — SU VICRYL 6-0 S-14DA 18" UND J670G

## (undated) RX ORDER — ACETAMINOPHEN 325 MG/1
TABLET ORAL
Status: DISPENSED
Start: 2021-02-15

## (undated) RX ORDER — DEXAMETHASONE SODIUM PHOSPHATE 4 MG/ML
INJECTION, SOLUTION INTRA-ARTICULAR; INTRALESIONAL; INTRAMUSCULAR; INTRAVENOUS; SOFT TISSUE
Status: DISPENSED
Start: 2021-02-15

## (undated) RX ORDER — OXYMETAZOLINE HYDROCHLORIDE 0.05 G/100ML
SPRAY NASAL
Status: DISPENSED
Start: 2021-02-15

## (undated) RX ORDER — PROPOFOL 10 MG/ML
INJECTION, EMULSION INTRAVENOUS
Status: DISPENSED
Start: 2021-02-15

## (undated) RX ORDER — KETOROLAC TROMETHAMINE 30 MG/ML
INJECTION, SOLUTION INTRAMUSCULAR; INTRAVENOUS
Status: DISPENSED
Start: 2021-02-15

## (undated) RX ORDER — ONDANSETRON 2 MG/ML
INJECTION INTRAMUSCULAR; INTRAVENOUS
Status: DISPENSED
Start: 2021-02-15

## (undated) RX ORDER — GABAPENTIN 300 MG/1
CAPSULE ORAL
Status: DISPENSED
Start: 2021-02-15

## (undated) RX ORDER — FENTANYL CITRATE 50 UG/ML
INJECTION, SOLUTION INTRAMUSCULAR; INTRAVENOUS
Status: DISPENSED
Start: 2021-02-15

## (undated) RX ORDER — LIDOCAINE HYDROCHLORIDE 20 MG/ML
INJECTION, SOLUTION EPIDURAL; INFILTRATION; INTRACAUDAL; PERINEURAL
Status: DISPENSED
Start: 2021-02-15

## (undated) RX ORDER — OXYCODONE HYDROCHLORIDE 5 MG/1
TABLET ORAL
Status: DISPENSED
Start: 2021-02-15